# Patient Record
Sex: FEMALE | Race: WHITE | NOT HISPANIC OR LATINO | URBAN - METROPOLITAN AREA
[De-identification: names, ages, dates, MRNs, and addresses within clinical notes are randomized per-mention and may not be internally consistent; named-entity substitution may affect disease eponyms.]

---

## 2022-10-12 ENCOUNTER — EMERGENCY (EMERGENCY)
Facility: HOSPITAL | Age: 37
LOS: 1 days | Discharge: HOME | End: 2022-10-12
Admitting: EMERGENCY MEDICINE

## 2022-10-12 VITALS
SYSTOLIC BLOOD PRESSURE: 110 MMHG | RESPIRATION RATE: 18 BRPM | OXYGEN SATURATION: 98 % | DIASTOLIC BLOOD PRESSURE: 79 MMHG | HEART RATE: 65 BPM

## 2022-10-12 VITALS
WEIGHT: 136.03 LBS | SYSTOLIC BLOOD PRESSURE: 112 MMHG | HEIGHT: 67 IN | RESPIRATION RATE: 18 BRPM | HEART RATE: 57 BPM | DIASTOLIC BLOOD PRESSURE: 74 MMHG | TEMPERATURE: 98 F | OXYGEN SATURATION: 100 %

## 2022-10-12 PROCEDURE — 70450 CT HEAD/BRAIN W/O DYE: CPT | Mod: 26

## 2022-10-12 PROCEDURE — 12002 RPR S/N/AX/GEN/TRNK2.6-7.5CM: CPT

## 2022-10-12 PROCEDURE — 99285 EMERGENCY DEPT VISIT HI MDM: CPT | Mod: 25

## 2022-10-12 PROCEDURE — 72100 X-RAY EXAM L-S SPINE 2/3 VWS: CPT | Mod: 26

## 2022-10-12 PROCEDURE — 72125 CT NECK SPINE W/O DYE: CPT | Mod: 26

## 2022-10-12 RX ORDER — ACETAMINOPHEN 500 MG
650 TABLET ORAL ONCE
Refills: 0 | Status: COMPLETED | OUTPATIENT
Start: 2022-10-12 | End: 2022-10-12

## 2022-10-12 RX ORDER — ACETAMINOPHEN 500 MG
650 TABLET ORAL ONCE
Refills: 0 | Status: DISCONTINUED | OUTPATIENT
Start: 2022-10-12 | End: 2022-10-16

## 2022-10-12 RX ORDER — TETANUS TOXOID, REDUCED DIPHTHERIA TOXOID AND ACELLULAR PERTUSSIS VACCINE, ADSORBED 5; 2.5; 8; 8; 2.5 [IU]/.5ML; [IU]/.5ML; UG/.5ML; UG/.5ML; UG/.5ML
0.5 SUSPENSION INTRAMUSCULAR ONCE
Refills: 0 | Status: COMPLETED | OUTPATIENT
Start: 2022-10-12 | End: 2022-10-12

## 2022-10-12 RX ADMIN — TETANUS TOXOID, REDUCED DIPHTHERIA TOXOID AND ACELLULAR PERTUSSIS VACCINE, ADSORBED 0.5 MILLILITER(S): 5; 2.5; 8; 8; 2.5 SUSPENSION INTRAMUSCULAR at 14:34

## 2022-10-12 RX ADMIN — Medication 650 MILLIGRAM(S): at 14:33

## 2022-10-12 NOTE — ED PROVIDER NOTE - NS ED ROS FT
Constitutional:  no fever, no chills  Eyes:  no discharge, no irritations, no pain, no redness, visual changes  Ears:  no ear pain, no ear drainage,  no hearing problems  Nose:  no nasal congestion, no nasal drainage  Mouth/Throat:  no hoarseness and no throat pain  Neck:  no stiffness, no pain, no lumps, no swollen glands  Cardiac:  no chest pain, no edema  Respiratory:  no cough, no shortness of breath  GI: no abdominal pain, no bloating, no constipation, no diarrhea, no nausea, no vomiting  :  no dysuria, no urinary frequency, no hematuria, no discharge  MSK:  n+ back pain, no msk pain, no weakness  NEURO:  + headache, no weakness, no numbness  Skin:  no lesions, no pruritis, no jaundice, no bruising, no rash  Psych:  no known mental health issues  Endocrine:  no diabetes, no thyroid issues

## 2022-10-12 NOTE — ED PROVIDER NOTE - PATIENT PORTAL LINK FT
You can access the FollowMyHealth Patient Portal offered by [facility] by registering at the following website: [email or web address]. By joining [facility]’s FollowMyHealth portal, you will also be able to view your health information using other applications (apps) compatible with our system.

## 2022-10-12 NOTE — ED ADULT TRIAGE NOTE - CHIEF COMPLAINT QUOTE
sent in from urgent care for further eval of head injury. as per pt, attempted to do a pull up and bar fell causing her to fall backwards. sustained scalp laceration. denies LOC, anticoagulants, vision change, vomiting. last tdp unk. h/o migraines

## 2022-10-12 NOTE — ED PROVIDER NOTE - NSFOLLOWUPINSTRUCTIONS_ED_ALL_ED_FT
HEAD INJURY    A head injury can include your scalp, face, skull, or brain and range from mild to severe. Effects can appear immediately after the injury or develop later. The effects may last a short time or be permanent. Healthcare providers may want to check your recovery over time. Treatment may change as you recover or develop new health problems from the head injury.    DISCHARGE INSTRUCTIONS:    Call your local emergency number (911 in the [place]), or have someone else call if:   •You cannot be woken.  •You have a seizure.  •You stop responding to others or you faint.  •You have blurry or double vision.  •Your speech becomes slurred or confused.  •You have arm or leg weakness, loss of feeling, or new problems with coordination.  •Your pupils are larger than usual, or one pupil is a different size than the other.  •You have blood or clear fluid coming out of your ears or nose.      Return to the emergency department if:   •You have repeated or forceful vomiting.  •You feel confused.  •Your headache gets worse or becomes severe.  •You or someone caring for you notices that you are harder to wake than usual.      Call your doctor if:   •Your symptoms last longer than 6 weeks after the injury.  •You have questions or concerns about your condition or care.      Medicines:   •Acetaminophen decreases pain and fever. It is available without a doctor's order. Ask how much to take and how often to take it. Follow directions. Read the labels of all other medicines you are using to see if they also contain acetaminophen, or ask your doctor or pharmacist. Acetaminophen can cause liver damage if not taken correctly. Do not use more than 4 grams (4,000 milligrams) total of acetaminophen in one day.       •Take your medicine as directed. Contact your healthcare provider if you think your medicine is not helping or if you have side effects. Tell him or her if you are allergic to any medicine. Keep a list of the medicines, vitamins, and herbs you take. Include the amounts, and when and why you take them. Bring the list or the pill bottles to follow-up visits. Carry your medicine list with you in case of an emergency.      Self-care:   •Rest or do quiet activities. Limit your time watching TV, using the computer, or doing tasks that require a lot of thinking. Slowly return to your normal activities as directed. Do not play sports or do activities that may cause you to get hit in the head. Ask your healthcare provider when you can return to sports.      •Apply ice on your head for 15 to 20 minutes every hour or as directed. Use an ice pack, or put crushed ice in a plastic bag. Cover it with a towel before you apply it to your skin. Ice helps prevent tissue damage and decreases swelling and pain.      •Have someone stay with you for 24 hours , or as directed. This person can monitor you for problems and call for help if needed. When you are awake, the person should ask you a few questions every few hours to see if you are thinking clearly. An example is to ask your name or address.      Prevent another head injury:   •Wear a helmet that fits properly. Do this when you play sports, or ride a bike, scooter, or skateboard. Helmets help decrease your risk for a serious head injury. Talk to your healthcare provider about other ways you can protect yourself if you play sports.      •Wear your seatbelt every time you are in a car. This helps lower your risk for a head injury if you are in a car accident.    Follow up with your doctor as directed: Write down your questions so you remember to ask them during your visits.      STAPLE CARE    Staples are often used to close a wound. Your staples may be placed for 3 to 14 days, depending on the location of your wound.  HAVE YOUR STAPLES REMOVED AT AN URGENT CARE, PRIMARY CARE OFFICE OR RETURN TO THE EMERGENCY DEPARTMENT FOR STAPLE REMOVAL IN __7____ DAYS.    DISCHARGE INSTRUCTIONS:  Care for your wound:   •Clean: You may be able to shower in 24 hours. Do not soak your wound under water.    Gently wash your wound with soap and warm water daily. Lightly pat it dry. Do not cover your wound unless your healthcare provider tells you to.     You may also need to clean your wound with a mixture of hydrogen peroxide and water. Ask how to do this.    Do not apply ointment or cream to the wound unless your healthcare provider tells you to.    •Minimize scarring: Avoid sunshine on your wound to reduce scarring.     Follow up with your healthcare provider as directed: You may need to return for a wound checkup 3 days after your staples are placed. Ask when you should return to get your staples removed.    Staple removal:   •A medical staple remover will be used to take out your staples. Your healthcare provider will slide the tool under each staple, squeeze the handle, and gently pull the staple out.     •Medical tape will be placed on your wound once your staples are removed. This will help keep your wound closed. The medical tape will fall off on its own after several days.     Contact your healthcare provider if:     Return to the emergency department if:   •You have redness, pain, swelling, and pus draining from your wound.  •Your pain medicine does not relieve your pain.  •You have a fever of 101°F (38.5°C) or higher.  •You have an odor coming from your wound.  •You have questions or concerns about your condition or care.  •Your wound reopens.  •You have red streaks in your skin that spread out from your wound.  •You have severe pain or vomiting.      Contusion    A contusion is a deep bruise. Contusions are the result of a blunt injury to tissues and muscle fibers under the skin. The skin overlying the contusion may turn blue, purple, or yellow. Symptoms also include pain and swelling in the injured area.    SEEK IMMEDIATE MEDICAL CARE IF YOU HAVE ANY OF THE FOLLOWING SYMPTOMS: severe pain, numbness, tingling, pain, weakness, or skin color/temperature change in any part of your body distal to the injury.

## 2022-10-12 NOTE — ED PROVIDER NOTE - PHYSICAL EXAMINATION
Constitutional:  Well appearing, awake, alert, oriented to person, place, time/situation and in no apparent distress  Head:  NC/AT, symmetric, neck supple  ENMT: Airway patent, nasal mucosa clear, mouth with normal mucosa, throat has no vesicles, no oropharyngeal exudates and uvula is midline  Eyes:  Clear bilaterally, pupils equal, round and reactive to light  Cardiac:  Normal rate, regular rhythm. Heart sounds S1,S2.  No murmurs, rubs or gallops.  No JVD.  Respiratory:  Breath sounds clear and equal bilaterally  GI:   Abd soft, non-distended, non-tender, no guarding  MSK:  Spine appears normal, range of motion is not limited, no muscle or joint tenderness  Neuro:  Alert and oriented, no focal deficits, no motor or sensory deficits  Skin:  Skin normal color for race, warm, dry.  5 cm vertical laceration to occipital scalp, no active drainage, no fb.  Psych:  Normal mood and affect, no apparent risk to self or others.  Heme:  No adenopathy or splenomegaly.

## 2022-10-12 NOTE — ED PROVIDER NOTE - OBJECTIVE STATEMENT
37-year-old female, with PMH of migraine, sent to the emergency department from urgent care for evaluation of closed head injury today while at work.  Patient explains she was on a pull-up bar at work when the bar broke off of the door causing her to fall backwards and strike her head on the floor.  She denies LOC.  No anticoagulants.  Patient complains of a headache and sacral pain.  Last tetanus unknown.    Pt denies fevers/chills, neck or back pain, visual changes, sore throat, chest pain, palpitations, cough, SOB, abd pain, n/v/d, dysuria, hematuria, weakness, dizziness, numbness, lower extremity swelling, rash, sick contacts, recent hospitalizations, recent travels.

## 2022-10-12 NOTE — ED ADULT NURSE NOTE - OBJECTIVE STATEMENT
Patient presents with head injury and laceration to the right occipital after falling backward and hitting her head and tailbone. Patient was doing a pull up and the bar was not secured enough which caused her to fall backward with the bar and hit her head and back. C/o bleeding from laceration, throbbing headache radiating around her eyes, constant nausea and lower back pain. Denies vomiting, dizziness, blurry vision, CP, SOB, LOC. Unknown tetanus status. Patient was able to walk afterward. Alert and oriented x 3.

## 2022-10-12 NOTE — ED PROVIDER NOTE - CARE PLAN
1 Principal Discharge DX:	Closed head injury  Secondary Diagnosis:	Scalp laceration  Secondary Diagnosis:	Sacral contusion

## 2022-10-12 NOTE — ED PROVIDER NOTE - CLINICAL SUMMARY MEDICAL DECISION MAKING FREE TEXT BOX
37-year-old female evaluated in the emergency department status post fall.  CT head and cervical spine were negative.  Scalp laceration repaired as documented.  Wet read of the lumbar spine and sacrum negative for acute injury.  Wound care discussed.      Results and diagnosis discussed with patient.  Treatment plan discussed.  Return precautions discussed.  Pt verbalized understanding and given the opportunity to ask questions.  Patient advised to follow up with primary care provider.

## 2022-10-12 NOTE — ED ADULT NURSE NOTE - CHPI ED NUR SYMPTOMS NEG
no blurred vision/no confusion/no dizziness/no loss of consciousness/no seizure/no syncope/no vomiting

## 2022-10-14 DIAGNOSIS — S09.90XA UNSPECIFIED INJURY OF HEAD, INITIAL ENCOUNTER: ICD-10-CM

## 2022-10-14 DIAGNOSIS — G43.909 MIGRAINE, UNSPECIFIED, NOT INTRACTABLE, WITHOUT STATUS MIGRAINOSUS: ICD-10-CM

## 2022-10-14 DIAGNOSIS — M54.18 RADICULOPATHY, SACRAL AND SACROCOCCYGEAL REGION: ICD-10-CM

## 2022-10-14 DIAGNOSIS — Z23 ENCOUNTER FOR IMMUNIZATION: ICD-10-CM

## 2022-10-14 DIAGNOSIS — S01.01XA LACERATION WITHOUT FOREIGN BODY OF SCALP, INITIAL ENCOUNTER: ICD-10-CM
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
## 2022-10-19 ENCOUNTER — EMERGENCY (EMERGENCY)
Facility: HOSPITAL | Age: 37
LOS: 1 days | Discharge: HOME | End: 2022-10-19
Admitting: EMERGENCY MEDICINE

## 2022-10-19 VITALS
HEIGHT: 67 IN | WEIGHT: 145.95 LBS | OXYGEN SATURATION: 98 % | HEART RATE: 63 BPM | SYSTOLIC BLOOD PRESSURE: 108 MMHG | RESPIRATION RATE: 18 BRPM | DIASTOLIC BLOOD PRESSURE: 75 MMHG | TEMPERATURE: 98 F

## 2022-10-19 DIAGNOSIS — S01.81XD LACERATION WITHOUT FOREIGN BODY OF OTHER PART OF HEAD, SUBSEQUENT ENCOUNTER: ICD-10-CM

## 2022-10-19 DIAGNOSIS — G43.909 MIGRAINE, UNSPECIFIED, NOT INTRACTABLE, WITHOUT STATUS MIGRAINOSUS: ICD-10-CM
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
## 2022-10-19 PROCEDURE — L9995: CPT

## 2022-10-19 NOTE — ED PROVIDER NOTE - CLINICAL SUMMARY MEDICAL DECISION MAKING FREE TEXT BOX
Patient presenting to ED for staples removal.  8 staples removed without incident, patient tolerated well.  She is stable on discharge and leaves in no acute distress.

## 2022-10-19 NOTE — ED ADULT TRIAGE NOTE - HAVE YOU RECEIVED AT LEAST TWO PFIZER AND/OR MODERNA VACCINATIONS (IN ANY COMBINATION) AND/OR ONE JOHNSON & JOHNSON VACCINATION?
Detail Level: Detailed Quality 226: Preventive Care And Screening: Tobacco Use: Screening And Cessation Intervention: Patient screened for tobacco use and is an ex/non-smoker Quality 130: Documentation Of Current Medications In The Medical Record: Current Medications Documented Quality 110: Preventive Care And Screening: Influenza Immunization: Influenza Immunization Administered during Influenza season Yes

## 2022-10-19 NOTE — ED PROVIDER NOTE - NSFOLLOWUPINSTRUCTIONS_ED_ALL_ED_FT
Staple Care    WHAT YOU NEED TO KNOW:    Staples are often used to close a wound. Your staples may be placed for 3 to 14 days, depending on the location of your wound.    DISCHARGE INSTRUCTIONS:    Care for your wound:   •Clean: ?You may be able to shower in 24 hours. Do not soak your wound under water.      ?Gently wash your wound with soap and warm water daily. Lightly pat it dry. Do not cover your wound unless your healthcare provider tells you to.       ?You may also need to clean your wound with a mixture of hydrogen peroxide and water. Ask how to do this.      ?Do not apply ointment or cream to the wound unless your healthcare provider tells you to.      •Elevate: ?Rest any arm or leg that has a wound on pillows above the level of your heart. Do this as often as possible for 2 days. This will help decrease swelling and pain, and help you heal faster.          •Minimize scarring: ?Avoid sunshine on your wound to reduce scarring.             Follow up with your healthcare provider as directed: You may need to return for a wound checkup 3 days after your staples are placed. Ask when you should return to get your staples removed.    Staple removal:   •A medical staple remover will be used to take out your staples. Your healthcare provider will slide the tool under each staple, squeeze the handle, and gently pull the staple out.       •Medical tape will be placed on your wound once your staples are removed. This will help keep your wound closed. The medical tape will fall off on its own after several days.       Contact your healthcare provider if:   •You have redness, pain, swelling, and pus draining from your wound.      •Your pain medicine does not relieve your pain.      •You have a fever of 101°F (38.5°C) or higher.      •You have an odor coming from your wound.      •You have questions or concerns about your condition or care.      Return to the emergency department if:   •Your wound reopens.      •You have red streaks in your skin that spread out from your wound.      •You have severe pain or vomiting.      ---        SARS-CoV-2 virus (COVID-19) Vaccine Suspension for Injection (Pfizer-BioNTech COVID-19 Vaccine)      What is this medication?    COVID-19 VACCINE (koh-vid 19 vak SEEN) reduces the risk of COVID-19. It does not treat COVID-19. It is still possible to get COVID-19 after receiving this vaccine, but the symptoms may be less severe or not last as long. It works by helping your immune system learn how to fight off a future infection.    This medicine may be used for other purposes; ask your health care provider or pharmacist if you have questions.    COMMON BRAND NAME(S): COMIRNATY COVID-19, Pfizer-BioNTech COVID-19      What should I tell my care team before I take this medication?    They need to know if you have any of these conditions:  •Any allergies  •Bleeding disorder  •Fever or infection  •Immune system problems  •Recent or upcoming vaccine including previous COVID-19 vaccine  •An unusual or allergic reaction to COVID-19 vaccine, other medications, foods, dyes, or preservatives  •Pregnant or trying to get pregnant  •Breast-feeding      How should I use this medication?    This vaccine is injected into a muscle. It is given by your care team.    This vaccine requires more than 1 dose to get the full benefit. Set a reminder for when your next dose is due. It is important you get the same type or brand of vaccine for both doses.    You will get a vaccination card to show you when to return for your next dose. Remember to bring your card with you when you return for your next dose.    If you are immunocompromised, you may get a third dose of the vaccine at least 1 month after the second dose.    Booster doses may be needed after finishing the primary series. Talk to your care team about your vaccination schedule.    A copy of the Fact Sheet for Recipients and Caregivers will be given before each vaccination. Be sure to read this information carefully each time. This sheet may change frequently.    Talk to your care team about the use of this vaccine in children. While it may be given to children as young as 6 months for selected conditions, precautions do apply.    Overdosage: If you think you have taken too much of this medicine contact a poison control center or emergency room at once.    NOTE: This medicine is only for you. Do not share this medicine with others.      What if I miss a dose?    It is important not to miss your dose. Call your care team if you are unable to keep an appointment.      What may interact with this medication?    This medication may interact with the following:  •Certain medications that thin your blood  •Chemotherapy or radiation therapy  •Medications that lower your chance of fighting infection  •Steroid medications, such as prednisone or cortisone    This list may not describe all possible interactions. Give your health care provider a list of all the medicines, herbs, non-prescription drugs, or dietary supplements you use. Also tell them if you smoke, drink alcohol, or use illegal drugs. Some items may interact with your medicine.      What should I watch for while using this medication?    Visit your care team regularly.    Heart muscle inflammation has been reported after receiving this vaccine. It is not known whether the vaccine causes the heart inflammation. Talk to your care team right away if you have unusual weakness or fatigue, shortness of breath, chest pain, fast or irregular heartbeat, dizziness, or swelling of the ankles, feet, or hands. The Centers for Disease Control (CDC) is monitoring these reports to see if there is any relationship to this vaccine.    This vaccine, like all vaccines, may not fully protect everyone. Continue to follow all guidelines to prevent exposure.      What side effects may I notice from receiving this medication?    Side effects that you should report to your care team as soon as possible:  •Allergic reactions—skin rash, itching, hives, swelling of the face, lips, tongue, or throat  •Heart muscle inflammation—unusual weakness or fatigue, shortness of breath, chest pain, fast or irregular heartbeat, dizziness, swelling of the ankles, feet, or hands    Side effects that usually do not require medical attention (report these to your care team if they continue or are bothersome):  •Chills  •Fatigue  •Fever  •Headache  •Joint pain  •Muscle pain  •Nausea  •Pain, redness, or irritation at injection site  •Swollen lymph nodes  •Vomiting    This list may not describe all possible side effects. Call your doctor for medical advice about side effects. You may report side effects to FDA at 2-488-HCY-8049.      Where should I keep my medication?    This vaccine is only given by your care team. It will not be stored at home.    NOTE: This sheet is a summary. It may not cover all possible information. If you have questions about this medicine, talk to your doctor, pharmacist, or health care provider.

## 2022-10-19 NOTE — ED PROVIDER NOTE - OBJECTIVE STATEMENT
37-year-old female, presenting to the ED for staple removal.  Patient denies any complaints with the injury sustained over a week ago.  Denies headaches, dizziness, fevers, chills, or discharge from the area.

## 2022-10-19 NOTE — ED PROVIDER NOTE - ENMT, MLM
+previously repaired lac on top of head w/ 8 sutures in place, no wound dehiscence, no signs of infection, healing well.

## 2022-10-19 NOTE — ED PROVIDER NOTE - PATIENT PORTAL LINK FT
You can access the FollowMyHealth Patient Portal offered by [facility] by registering at the following website: [email or web address]. By joining [employer]’s FollowMyHealth portal, you will also be able to view your health information using other applications (apps) compatible with our system.

## 2022-10-19 NOTE — ED ADULT TRIAGE NOTE - BSA (M2)
1.77 Vaccine Information Sheet (VIS) provided-VIS date: 8/15/19/Risks/benefits discussed with patient/surrogate

## 2022-10-19 NOTE — ED ADULT NURSE NOTE - OBJECTIVE STATEMENT
36 y/o female who is here for staple removal to scalp- was seen here and states she had 8 staples placed in. Pt is A+OX3 denies fever, or chills

## 2023-02-08 NOTE — ED ADULT NURSE NOTE - FINAL NURSING ELECTRONIC SIGNATURE
[phone] (home) [phone] (work)  Left vm for pt stating no further antibiotics are required per dr Clearance Heady 19-Oct-2022 10:13

## 2023-03-09 NOTE — ED PROCEDURE NOTE - PROCEDURE DATE TIME, MLM
[Negative] : Allergic/Immunologic [FreeTextEntry7] : hemorrhoidal pain [FreeTextEntry9] : lower back pain 12-Oct-2022 19:29

## 2025-05-29 NOTE — ED PROVIDER NOTE - DISPOSITION TYPE
Clinic hours for Dr. Bowen:  Monday 7:30am - 4:30pm  Tuesday Off  Wednesday 7am - 4:15pm  Thursday 7:30am - 4:30pm  Friday  7am - 3:30pm    If you need a refill on your prescription, please call your pharmacy and let them know. Please be proactive and call before your medication runs out. The pharmacy will then contact us for the refill. Please allow 24-48 hours for the refill to be processed.     If your physician has ordered additional laboratory or radiology testing as part of your ongoing plan of care, please allow 5-7 business days from the day of your lab draw or test for the results to be sent and reviewed by your provider. If your results are critical and require more immediate intervention, you will be contacted sooner. Your results will be conveyed to you via a phone call or letter.    You may be receiving a patient satisfaction survey in the mail or in your email. If you receive an email survey, please look for the subject line of: \" Your provider name\" would like your feedback\". Please take the time to complete your survey either via the mail or email, as your feedback is very important to us. We strive to make your experience exceptional ad your comments help us with that goal. We look forward to hearing from you.         DISCHARGE